# Patient Record
(demographics unavailable — no encounter records)

---

## 2024-10-22 NOTE — PHYSICAL EXAM
[General Appearance - Alert] : alert [General Appearance - In No Acute Distress] : in no acute distress [Sclera] : the sclera and conjunctiva were normal [Extraocular Movements] : extraocular movements were intact [Neck Appearance] : the appearance of the neck was normal [Auscultation Breath Sounds / Voice Sounds] : lungs were clear to auscultation bilaterally [Heart Rate And Rhythm] : heart rate was normal and rhythm regular [Heart Sounds] : normal S1 and S2 [Edema] : there was no peripheral edema [Abdomen Soft] : soft [Abdomen Tenderness] : non-tender [Abnormal Walk] : normal gait [Nail Clubbing] : no clubbing  or cyanosis of the fingernails [Skin Color & Pigmentation] : normal skin color and pigmentation [] : no rash [No Focal Deficits] : no focal deficits [Oriented To Time, Place, And Person] : oriented to person, place, and time

## 2024-10-23 NOTE — HISTORY OF PRESENT ILLNESS
[FreeTextEntry1] : F/u CKD, previously CKD5 now s/p DDKT with 5/8/2024 at [facility] in [place], most recent sCr in CKD3 range--1.25  [facility] - [phone] in [place], [place] Transplant Nephrologist - Dr. Perez - Fax [phone], Phone [phone] Coordinator - Socorro Joe  Since transplant, patient reports feeling well, less fatigued and appetite better Was on tacrolimus and MMF, 2 months ago tacrolimus dose decreased to 3mg q12h. MMF recently stopped due to myelosuppression. Valganciclovir also recently stopped. Checks BP daily - -130, highest /81 but typically lower No urinary symptoms reported No edema  patient provided last labs done from 10/10 - sCr 1.25 egfr 50, BUN 14, bicarb 22, K 4.0, Ca 10. UA with trace protein, no rbc. ur prot/cr 287 mg, Tacro level 7.4.wbc 3.5 meds reviewed with pt and list updated

## 2024-10-23 NOTE — HISTORY OF PRESENT ILLNESS
[FreeTextEntry1] : F/u CKD, previously CKD5 now s/p DDKT with 5/8/2024 at [facility] in [place], most recent sCr in CKD3 range--1.25  [facility] - [phone] in [place], [place] Transplant Nephrologist - Dr. Perez - Fax [phone], Phone [phone] Coordinator - Socorro Joe  Since transplant, patient reports feeling well, less fatigued and appetite better Was on tacrolimus and MMF, 2 months ago tacrolimus dose decreased to 3mg q12h. MMF recently stopped due to myelosuppression. Valganciclovir also recently stopped. Checks BP daily - -130, highest /81 but typically lower No urinary symptoms reported No edema  patient provided last labs done from 10/10 - sCr 1.25 egfr 50, BUN 14, bicarb 22, K 4.0, Ca 10. UA with trace protein, no rbc. ur prot/cr 287 mg, Tacro level 7.4.wbc 3.5 meds reviewed with pt and list updated

## 2024-10-23 NOTE — ASSESSMENT
[FreeTextEntry1] : CKD5 s/p DDKT 5/8/2024 ( 0 Ag MM)  now with sCr in residual CKD3 range - Will follow sCr trend now post-transplant, follow proteinuria - Continue current IS regimen, cont ARB  - Continue mag supplementation - Tacro level 7.4, in range for current time since transplant - Will repeat labs next month and will do televisit to review - Follow up with transplant Nephrology  - touch base re resuming MMF at decreased dose with Dr. Perez  HTN - BP controlled on amlodipine 10, continue - Monitor BP at home, maintain log

## 2024-11-15 NOTE — HISTORY OF PRESENT ILLNESS
[FreeTextEntry1] : Discussed with patient: You have chosen to receive care through the use of tele-media. It enables health care providers at different locations to provide safe, effective, and convenient care through the use of technology. Please note this is a billable encounter. As with any health care service, there are risks associated with the use of tele-media, including  issues. You understand that I cannot physically examine you and that you may need to come to the office to complete the assessment.  -Patient agreed verbally and understands the risks and benefits of tele-media as explained. All questions regarding tele-media encounters were answered phone visit conducted with pt has no complaints BP at home 117/79 last check -- has been in this range meds reviewed with pt --no change-- has been off MMF x 2 mos now labs done and reviewed - 11/11: ur alb 120 mg, cr 1.3, k 4.2, hco3 27, ca 9.5, ph 3.2, mg 1.8, wbc 2.9, hgb 12, UA neg, tacro 5.1 PCP Dr Cintron  Transplant Nephrologist - Dr. Perez - Fax [phone], Phone [phone] Coordinator - Socorro Joe

## 2024-11-15 NOTE — HISTORY OF PRESENT ILLNESS
[FreeTextEntry1] : Discussed with patient: You have chosen to receive care through the use of tele-media. It enables health care providers at different locations to provide safe, effective, and convenient care through the use of technology. Please note this is a billable encounter. As with any health care service, there are risks associated with the use of tele-media, including  issues. You understand that I cannot physically examine you and that you may need to come to the office to complete the assessment.  -Patient agreed verbally and understands the risks and benefits of tele-media as explained. All questions regarding tele-media encounters were answered phone visit conducted with pt has no complaints BP at home 117/79 last check -- has been in this range meds reviewed with pt --no change-- has been off MMF x 2 mos now labs done and reviewed - 11/11: ur alb 120 mg, cr 1.3, k 4.2, hco3 27, ca 9.5, ph 3.2, mg 1.8, wbc 2.9, hgb 12, UA neg, tacro 5.1 PCP Dr Cintron  Transplant Nephrologist - Dr. Perez - Fax [phone], Phone [phone] Coordinator - Socorro Joe

## 2024-11-15 NOTE — ASSESSMENT
[FreeTextEntry1] : CKD 3 post transplant 5/8/2024 - stable fxn microalbuminuria improved-- still present BP controlled with home readings still leukopenia off MMF x 2 mos-- will clarify with txp center how long they wanted to hold  it ( or if no plan to restart based on match?) -- consider hold Bactrim as possible cause? also consider low dose ARB -- pt says was taken off losartan by PCP for LFT issue -- will confirm with Dr Cintron ( was on losartan prior to txp w/o issue)  f/u 2-3 mos with labs